# Patient Record
Sex: MALE | Race: WHITE | NOT HISPANIC OR LATINO
[De-identification: names, ages, dates, MRNs, and addresses within clinical notes are randomized per-mention and may not be internally consistent; named-entity substitution may affect disease eponyms.]

---

## 2017-08-30 ENCOUNTER — APPOINTMENT (OUTPATIENT)
Dept: FAMILY MEDICINE | Facility: CLINIC | Age: 47
End: 2017-08-30

## 2017-08-31 ENCOUNTER — TRANSCRIPTION ENCOUNTER (OUTPATIENT)
Age: 47
End: 2017-08-31

## 2017-09-07 ENCOUNTER — TRANSCRIPTION ENCOUNTER (OUTPATIENT)
Age: 47
End: 2017-09-07

## 2018-02-10 ENCOUNTER — TRANSCRIPTION ENCOUNTER (OUTPATIENT)
Age: 48
End: 2018-02-10

## 2018-06-26 ENCOUNTER — TRANSCRIPTION ENCOUNTER (OUTPATIENT)
Age: 48
End: 2018-06-26

## 2018-11-26 ENCOUNTER — TRANSCRIPTION ENCOUNTER (OUTPATIENT)
Age: 48
End: 2018-11-26

## 2019-11-21 ENCOUNTER — APPOINTMENT (OUTPATIENT)
Dept: FAMILY MEDICINE | Facility: CLINIC | Age: 49
End: 2019-11-21
Payer: COMMERCIAL

## 2019-11-21 DIAGNOSIS — R94.5 ABNORMAL RESULTS OF LIVER FUNCTION STUDIES: ICD-10-CM

## 2019-11-21 DIAGNOSIS — Z12.12 ENCOUNTER FOR SCREENING FOR MALIGNANT NEOPLASM OF COLON: ICD-10-CM

## 2019-11-21 DIAGNOSIS — Z12.11 ENCOUNTER FOR SCREENING FOR MALIGNANT NEOPLASM OF COLON: ICD-10-CM

## 2019-11-21 PROCEDURE — 36415 COLL VENOUS BLD VENIPUNCTURE: CPT

## 2019-11-21 PROCEDURE — 99396 PREV VISIT EST AGE 40-64: CPT | Mod: 25

## 2019-11-21 PROCEDURE — G0008: CPT | Mod: 59

## 2019-11-21 PROCEDURE — 90686 IIV4 VACC NO PRSV 0.5 ML IM: CPT

## 2019-11-21 PROCEDURE — G0444 DEPRESSION SCREEN ANNUAL: CPT

## 2019-11-21 NOTE — COUNSELING
[Encouraged to maintain food diary] : Encouraged to maintain food diary [Encouraged to increase physical activity] : Encouraged to increase physical activity [Weigh Self Weekly] : weigh self weekly [Decrease Portions] : decrease portions [Keep Food Diary] : keep food diary [None] : None [Good understanding] : Patient has a good understanding of lifestyle changes and steps needed to achieve self management goal

## 2019-11-21 NOTE — HISTORY OF PRESENT ILLNESS
[FreeTextEntry1] : 49-year-old male here for a CPE. Patient is being seen after 3 years. [de-identified] : Past medical history is significant for chronic GERD and migraine headaches.\par no significant past surgical history.\par The patient is , Has 3 children, works in his own construction business, nonsmoker, no alcohol no drugs.\par He is on no current medication.

## 2019-11-21 NOTE — ASSESSMENT
[FreeTextEntry1] : Physical exam normal\par Blood and urine collected for complete physical profile.\par STD testing declined.\par Screening for colorectal cancer----colonoscopy done 18 months ago, normal. Fecal occult blood ordered.\par Screening for prostate cancer----PSA ordered.\par Flu shot given left deltoid-\par \par Patient is to maintain regular exercise, healthy eating and keep well hydrated.\par Follow up results 3 days.

## 2019-11-21 NOTE — HEALTH RISK ASSESSMENT
[Very Good] : ~his/her~  mood as very good [No] : In the past 12 months have you used drugs other than those required for medical reasons? No [No falls in past year] : Patient reported no falls in the past year [0] : 2) Feeling down, depressed, or hopeless: Not at all (0) [Patient reported colonoscopy was normal] : Patient reported colonoscopy was normal [HIV test declined] : HIV test declined [Hepatitis C test declined] : Hepatitis C test declined [None] : None [With Family] : lives with family [# of Members in Household ___] :  household currently consist of [unfilled] member(s) [Employed] : employed [College] : College [] :  [# Of Children ___] : has [unfilled] children [Sexually Active] : sexually active [Feels Safe at Home] : Feels safe at home [Fully functional (bathing, dressing, toileting, transferring, walking, feeding)] : Fully functional (bathing, dressing, toileting, transferring, walking, feeding) [Fully functional (using the telephone, shopping, preparing meals, housekeeping, doing laundry, using] : Fully functional and needs no help or supervision to perform IADLs (using the telephone, shopping, preparing meals, housekeeping, doing laundry, using transportation, managing medications and managing finances) [Reports normal functional visual acuity (ie: able to read med bottle)] : Reports normal functional visual acuity [Smoke Detector] : smoke detector [Carbon Monoxide Detector] : carbon monoxide detector [Guns at Home] : guns at home [Safety elements used in home] : safety elements used in home [Seat Belt] :  uses seat belt [Sunscreen] : uses sunscreen [Travel to Developing Areas] : travel to developing areas [Patient/Caregiver not ready to engage] : Patient/Caregiver not ready to engage [] : No [Audit-CScore] : 0 [[id number]] : 0 [Change in mental status noted] : No change in mental status noted [Language] : denies difficulty with language [Behavior] : denies difficulty with behavior [Learning/Retaining New Information] : denies difficulty learning/retaining new information [Handling Complex Tasks] : denies difficulty handling complex tasks [Reasoning] : denies difficulty with reasoning [Spatial Ability and Orientation] : denies difficulty with spatial ability and orientation [High Risk Behavior] : no high risk behavior [Reports changes in hearing] : Reports no changes in hearing [Reports changes in vision] : Reports no changes in vision [Reports changes in dental health] : Reports no changes in dental health [TB Exposure] : is not being exposed to tuberculosis [Caregiver Concerns] : does not have caregiver concerns [ColonoscopyDate] : 2018 [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

## 2019-11-22 ENCOUNTER — RESULT REVIEW (OUTPATIENT)
Age: 49
End: 2019-11-22

## 2019-11-22 LAB
25(OH)D3 SERPL-MCNC: 43.6 NG/ML
ALBUMIN SERPL ELPH-MCNC: 4.8 G/DL
ALP BLD-CCNC: 58 U/L
ALT SERPL-CCNC: 31 U/L
ANION GAP SERPL CALC-SCNC: 13 MMOL/L
APPEARANCE: CLEAR
AST SERPL-CCNC: 21 U/L
BASOPHILS # BLD AUTO: 0.07 K/UL
BASOPHILS NFR BLD AUTO: 1.2 %
BILIRUB SERPL-MCNC: 0.5 MG/DL
BILIRUBIN URINE: NEGATIVE
BLOOD URINE: NEGATIVE
BUN SERPL-MCNC: 17 MG/DL
CALCIUM SERPL-MCNC: 9.8 MG/DL
CHLORIDE SERPL-SCNC: 103 MMOL/L
CHOLEST SERPL-MCNC: 236 MG/DL
CHOLEST/HDLC SERPL: 5.4 RATIO
CO2 SERPL-SCNC: 25 MMOL/L
COLOR: YELLOW
CREAT SERPL-MCNC: 1.04 MG/DL
EOSINOPHIL # BLD AUTO: 0.11 K/UL
EOSINOPHIL NFR BLD AUTO: 1.8 %
ESTIMATED AVERAGE GLUCOSE: 108 MG/DL
GLUCOSE QUALITATIVE U: NEGATIVE
GLUCOSE SERPL-MCNC: 96 MG/DL
HBA1C MFR BLD HPLC: 5.4 %
HCT VFR BLD CALC: 49.1 %
HDLC SERPL-MCNC: 44 MG/DL
HGB BLD-MCNC: 16.2 G/DL
IMM GRANULOCYTES NFR BLD AUTO: 0.2 %
KETONES URINE: NEGATIVE
LDLC SERPL CALC-MCNC: 169 MG/DL
LEUKOCYTE ESTERASE URINE: NEGATIVE
LYMPHOCYTES # BLD AUTO: 2.19 K/UL
LYMPHOCYTES NFR BLD AUTO: 36.6 %
MAN DIFF?: NORMAL
MCHC RBC-ENTMCNC: 31.2 PG
MCHC RBC-ENTMCNC: 33 GM/DL
MCV RBC AUTO: 94.4 FL
MONOCYTES # BLD AUTO: 0.59 K/UL
MONOCYTES NFR BLD AUTO: 9.9 %
NEUTROPHILS # BLD AUTO: 3.01 K/UL
NEUTROPHILS NFR BLD AUTO: 50.3 %
NITRITE URINE: NEGATIVE
PH URINE: 6
PLATELET # BLD AUTO: 275 K/UL
POTASSIUM SERPL-SCNC: 4.8 MMOL/L
PROT SERPL-MCNC: 7.6 G/DL
PROTEIN URINE: NEGATIVE
PSA SERPL-MCNC: 0.43 NG/ML
RBC # BLD: 5.2 M/UL
RBC # FLD: 13 %
SODIUM SERPL-SCNC: 141 MMOL/L
SPECIFIC GRAVITY URINE: 1.02
TRIGL SERPL-MCNC: 116 MG/DL
TSH SERPL-ACNC: 1.85 UIU/ML
UROBILINOGEN URINE: NORMAL
WBC # FLD AUTO: 5.98 K/UL

## 2020-10-12 ENCOUNTER — APPOINTMENT (OUTPATIENT)
Dept: FAMILY MEDICINE | Facility: CLINIC | Age: 50
End: 2020-10-12
Payer: COMMERCIAL

## 2020-10-12 VITALS
WEIGHT: 231 LBS | RESPIRATION RATE: 14 BRPM | TEMPERATURE: 96.2 F | HEART RATE: 103 BPM | SYSTOLIC BLOOD PRESSURE: 118 MMHG | OXYGEN SATURATION: 98 % | BODY MASS INDEX: 29.65 KG/M2 | DIASTOLIC BLOOD PRESSURE: 80 MMHG | HEIGHT: 74 IN

## 2020-10-12 DIAGNOSIS — R21 RASH AND OTHER NONSPECIFIC SKIN ERUPTION: ICD-10-CM

## 2020-10-12 DIAGNOSIS — J06.9 ACUTE UPPER RESPIRATORY INFECTION, UNSPECIFIED: ICD-10-CM
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
## 2020-10-12 DIAGNOSIS — Z87.09 PERSONAL HISTORY OF OTHER DISEASES OF THE RESPIRATORY SYSTEM: ICD-10-CM

## 2020-10-12 DIAGNOSIS — Z00.00 ENCOUNTER FOR GENERAL ADULT MEDICAL EXAMINATION W/OUT ABNORMAL FINDINGS: ICD-10-CM

## 2020-10-12 DIAGNOSIS — Z23 ENCOUNTER FOR IMMUNIZATION: ICD-10-CM

## 2020-10-12 DIAGNOSIS — Z00.01 ENCOUNTER FOR GENERAL ADULT MEDICAL EXAMINATION WITH ABNORMAL FINDINGS: ICD-10-CM

## 2020-10-12 DIAGNOSIS — G43.909 MIGRAINE, UNSPECIFIED, NOT INTRACTABLE, W/OUT STATUS MIGRAINOSUS: ICD-10-CM

## 2020-10-12 PROCEDURE — 90686 IIV4 VACC NO PRSV 0.5 ML IM: CPT

## 2020-10-12 PROCEDURE — G0008: CPT

## 2021-06-23 ENCOUNTER — TRANSCRIPTION ENCOUNTER (OUTPATIENT)
Age: 51
End: 2021-06-23

## 2022-01-11 ENCOUNTER — NON-APPOINTMENT (OUTPATIENT)
Age: 52
End: 2022-01-11

## 2022-10-29 ENCOUNTER — NON-APPOINTMENT (OUTPATIENT)
Age: 52
End: 2022-10-29

## 2022-11-26 ENCOUNTER — NON-APPOINTMENT (OUTPATIENT)
Age: 52
End: 2022-11-26

## 2022-12-01 ENCOUNTER — LABORATORY RESULT (OUTPATIENT)
Age: 52
End: 2022-12-01

## 2022-12-01 ENCOUNTER — APPOINTMENT (OUTPATIENT)
Dept: FAMILY MEDICINE | Facility: CLINIC | Age: 52
End: 2022-12-01

## 2022-12-01 VITALS
SYSTOLIC BLOOD PRESSURE: 134 MMHG | WEIGHT: 245 LBS | BODY MASS INDEX: 31.44 KG/M2 | DIASTOLIC BLOOD PRESSURE: 80 MMHG | HEIGHT: 74 IN | RESPIRATION RATE: 12 BRPM | HEART RATE: 88 BPM | OXYGEN SATURATION: 98 % | TEMPERATURE: 97.9 F

## 2022-12-01 DIAGNOSIS — E55.9 VITAMIN D DEFICIENCY, UNSPECIFIED: ICD-10-CM

## 2022-12-01 DIAGNOSIS — Z11.52 ENCOUNTER FOR SCREENING FOR COVID-19: ICD-10-CM

## 2022-12-01 DIAGNOSIS — K21.9 GASTRO-ESOPHAGEAL REFLUX DISEASE W/OUT ESOPHAGITIS: ICD-10-CM

## 2022-12-01 DIAGNOSIS — R53.83 OTHER FATIGUE: ICD-10-CM

## 2022-12-01 DIAGNOSIS — Z12.5 ENCOUNTER FOR SCREENING FOR MALIGNANT NEOPLASM OF PROSTATE: ICD-10-CM

## 2022-12-01 PROCEDURE — 99214 OFFICE O/P EST MOD 30 MIN: CPT | Mod: 25

## 2022-12-01 PROCEDURE — 36415 COLL VENOUS BLD VENIPUNCTURE: CPT

## 2022-12-01 PROCEDURE — 69210 REMOVE IMPACTED EAR WAX UNI: CPT

## 2022-12-02 LAB
25(OH)D3 SERPL-MCNC: 42 NG/ML
ALBUMIN SERPL ELPH-MCNC: 4.7 G/DL
ALP BLD-CCNC: 68 U/L
ALT SERPL-CCNC: 28 U/L
ANION GAP SERPL CALC-SCNC: 17 MMOL/L
APPEARANCE: ABNORMAL
AST SERPL-CCNC: 18 U/L
BASOPHILS # BLD AUTO: 0.06 K/UL
BASOPHILS NFR BLD AUTO: 0.6 %
BILIRUB SERPL-MCNC: 0.4 MG/DL
BILIRUBIN URINE: NEGATIVE
BLOOD URINE: NEGATIVE
BUN SERPL-MCNC: 12 MG/DL
C PEPTIDE SERPL-MCNC: 9.3 NG/ML
CALCIUM SERPL-MCNC: 9.5 MG/DL
CHLORIDE SERPL-SCNC: 100 MMOL/L
CHOLEST SERPL-MCNC: 161 MG/DL
CO2 SERPL-SCNC: 23 MMOL/L
COLOR: YELLOW
COVID-19 NUCLEOCAPSID  GAM ANTIBODY INTERPRETATION: POSITIVE
COVID-19 SPIKE DOMAIN ANTIBODY INTERPRETATION: POSITIVE
CREAT SERPL-MCNC: 1.01 MG/DL
CREAT SPEC-SCNC: 260 MG/DL
EGFR: 89 ML/MIN/1.73M2
EOSINOPHIL # BLD AUTO: 0.39 K/UL
EOSINOPHIL NFR BLD AUTO: 3.6 %
ESTIMATED AVERAGE GLUCOSE: 117 MG/DL
FERRITIN SERPL-MCNC: 810 NG/ML
FOLATE SERPL-MCNC: 16.8 NG/ML
GLUCOSE QUALITATIVE U: NEGATIVE
GLUCOSE SERPL-MCNC: 85 MG/DL
HBA1C MFR BLD HPLC: 5.7 %
HCT VFR BLD CALC: 47 %
HDLC SERPL-MCNC: 36 MG/DL
HGB BLD-MCNC: 15.4 G/DL
IMM GRANULOCYTES NFR BLD AUTO: 0.3 %
IRON SATN MFR SERPL: 11 %
IRON SERPL-MCNC: 32 UG/DL
KETONES URINE: NEGATIVE
LDLC SERPL CALC-MCNC: 91 MG/DL
LEUKOCYTE ESTERASE URINE: NEGATIVE
LYMPHOCYTES # BLD AUTO: 2.51 K/UL
LYMPHOCYTES NFR BLD AUTO: 23.2 %
MAN DIFF?: NORMAL
MCHC RBC-ENTMCNC: 31.2 PG
MCHC RBC-ENTMCNC: 32.8 GM/DL
MCV RBC AUTO: 95.3 FL
MICROALBUMIN 24H UR DL<=1MG/L-MCNC: 2.1 MG/DL
MICROALBUMIN/CREAT 24H UR-RTO: 8 MG/G
MONOCYTES # BLD AUTO: 1.13 K/UL
MONOCYTES NFR BLD AUTO: 10.5 %
NEUTROPHILS # BLD AUTO: 6.68 K/UL
NEUTROPHILS NFR BLD AUTO: 61.8 %
NITRITE URINE: NEGATIVE
NONHDLC SERPL-MCNC: 126 MG/DL
PH URINE: 6
PLATELET # BLD AUTO: 327 K/UL
POTASSIUM SERPL-SCNC: 4.5 MMOL/L
PROT SERPL-MCNC: 7.4 G/DL
PROTEIN URINE: NORMAL
RBC # BLD: 4.93 M/UL
RBC # FLD: 12.7 %
SARS-COV-2 AB SERPL IA-ACNC: >250 U/ML
SARS-COV-2 AB SERPL QL IA: 144 INDEX
SODIUM SERPL-SCNC: 141 MMOL/L
SPECIFIC GRAVITY URINE: 1.02
T4 SERPL-MCNC: 6.3 UG/DL
TIBC SERPL-MCNC: 292 UG/DL
TRIGL SERPL-MCNC: 172 MG/DL
TSH SERPL-ACNC: 1.9 UIU/ML
UIBC SERPL-MCNC: 260 UG/DL
UROBILINOGEN URINE: NORMAL
VIT B12 SERPL-MCNC: 637 PG/ML
WBC # FLD AUTO: 10.8 K/UL

## 2022-12-05 ENCOUNTER — APPOINTMENT (OUTPATIENT)
Dept: FAMILY MEDICINE | Facility: CLINIC | Age: 52
End: 2022-12-05

## 2022-12-05 VITALS
SYSTOLIC BLOOD PRESSURE: 130 MMHG | DIASTOLIC BLOOD PRESSURE: 80 MMHG | BODY MASS INDEX: 30.54 KG/M2 | RESPIRATION RATE: 14 BRPM | HEART RATE: 94 BPM | OXYGEN SATURATION: 98 % | WEIGHT: 238 LBS | TEMPERATURE: 98 F | HEIGHT: 74 IN

## 2022-12-05 DIAGNOSIS — J06.9 ACUTE UPPER RESPIRATORY INFECTION, UNSPECIFIED: ICD-10-CM

## 2022-12-05 DIAGNOSIS — Z78.9 OTHER SPECIFIED HEALTH STATUS: ICD-10-CM

## 2022-12-05 LAB
PSA SERPL-MCNC: 0.45 NG/ML
SARS-COV-2 RDRP RESP QL NAA+PROBE: NEGATIVE
URINE CULTURE <10: NORMAL

## 2022-12-05 PROCEDURE — 99214 OFFICE O/P EST MOD 30 MIN: CPT | Mod: 25

## 2022-12-05 PROCEDURE — 87635 SARS-COV-2 COVID-19 AMP PRB: CPT

## 2022-12-05 PROCEDURE — 87804 INFLUENZA ASSAY W/OPTIC: CPT | Mod: QW

## 2022-12-05 RX ORDER — BROMPHENIRAMINE MALEATE, PSEUDOEPHEDRINE HYDROCHLORIDE, 2; 30; 10 MG/5ML; MG/5ML; MG/5ML
30-2-10 SYRUP ORAL
Qty: 200 | Refills: 0 | Status: COMPLETED | COMMUNITY
Start: 2022-11-27 | End: 2022-12-05

## 2022-12-06 LAB
TESTOST FREE SERPL-MCNC: 7.4 PG/ML
TESTOST SERPL-MCNC: 180 NG/DL

## 2023-11-03 RX ORDER — NEBIVOLOL HYDROCHLORIDE 5 MG/1
5 TABLET ORAL
Qty: 90 | Refills: 0 | Status: DISCONTINUED | COMMUNITY
Start: 2022-07-26 | End: 2023-11-03

## 2023-11-08 ENCOUNTER — APPOINTMENT (OUTPATIENT)
Dept: CARDIOLOGY | Facility: CLINIC | Age: 53
End: 2023-11-08
Payer: COMMERCIAL

## 2023-11-08 VITALS
OXYGEN SATURATION: 98 % | WEIGHT: 253 LBS | HEART RATE: 80 BPM | SYSTOLIC BLOOD PRESSURE: 118 MMHG | DIASTOLIC BLOOD PRESSURE: 80 MMHG | BODY MASS INDEX: 32.48 KG/M2

## 2023-11-08 PROCEDURE — 93000 ELECTROCARDIOGRAM COMPLETE: CPT

## 2023-11-08 PROCEDURE — 99204 OFFICE O/P NEW MOD 45 MIN: CPT | Mod: 25

## 2023-11-08 RX ORDER — PROMETHAZINE HYDROCHLORIDE AND DEXTROMETHORPHAN HYDROBROMIDE ORAL SOLUTION 15; 6.25 MG/5ML; MG/5ML
6.25-15 SOLUTION ORAL
Qty: 240 | Refills: 1 | Status: DISCONTINUED | COMMUNITY
Start: 2022-12-05 | End: 2023-11-08

## 2023-11-08 RX ORDER — IBUPROFEN 600 MG/1
600 TABLET, FILM COATED ORAL
Qty: 15 | Refills: 0 | Status: DISCONTINUED | COMMUNITY
Start: 2022-10-30 | End: 2023-11-08

## 2023-11-08 RX ORDER — AMOXICILLIN AND CLAVULANATE POTASSIUM 875; 125 MG/1; MG/1
875-125 TABLET, COATED ORAL
Qty: 14 | Refills: 0 | Status: DISCONTINUED | COMMUNITY
Start: 2022-12-01 | End: 2023-11-08

## 2023-11-08 RX ORDER — ROSUVASTATIN CALCIUM 5 MG/1
5 TABLET, FILM COATED ORAL DAILY
Qty: 90 | Refills: 2 | Status: DISCONTINUED | COMMUNITY
Start: 2022-08-26 | End: 2023-11-08

## 2023-12-09 ENCOUNTER — NON-APPOINTMENT (OUTPATIENT)
Age: 53
End: 2023-12-09

## 2024-01-02 ENCOUNTER — NON-APPOINTMENT (OUTPATIENT)
Age: 54
End: 2024-01-02

## 2024-01-02 ENCOUNTER — APPOINTMENT (OUTPATIENT)
Dept: ORTHOPEDIC SURGERY | Facility: CLINIC | Age: 54
End: 2024-01-02
Payer: COMMERCIAL

## 2024-01-02 VITALS — HEIGHT: 74 IN | WEIGHT: 250 LBS | BODY MASS INDEX: 32.08 KG/M2

## 2024-01-02 DIAGNOSIS — M47.812 SPONDYLOSIS W/OUT MYELOPATHY OR RADICULOPATHY, CERVICAL REGION: ICD-10-CM

## 2024-01-02 DIAGNOSIS — M19.019 PRIMARY OSTEOARTHRITIS, UNSPECIFIED SHOULDER: ICD-10-CM

## 2024-01-02 PROCEDURE — 99204 OFFICE O/P NEW MOD 45 MIN: CPT

## 2024-01-02 PROCEDURE — 72040 X-RAY EXAM NECK SPINE 2-3 VW: CPT

## 2024-01-02 PROCEDURE — 73030 X-RAY EXAM OF SHOULDER: CPT | Mod: LT

## 2024-01-02 RX ORDER — DICLOFENAC SODIUM 75 MG/1
75 TABLET, DELAYED RELEASE ORAL
Qty: 60 | Refills: 0 | Status: ACTIVE | COMMUNITY
Start: 2024-01-02 | End: 1900-01-01

## 2024-01-02 NOTE — DISCUSSION/SUMMARY
[de-identified] : I went over the pathophysiology of the patient's symptoms in great detail with the patient. I discussed the underlying pathophysiology of the patient's condition in great detail with the patient. I went over the patient's x-rays with them in great detail. We recommended the use of Diclofenac at this time. A prescription was provided. At this time, he will start a course of physical therapy for strengthening and flexibility. A prescription was provided. We discussed the use of ice, Tylenol and anti-inflammatories to relieve pain.   All of their questions were answered. They understand and consent to the plan.   FU in 4-6 weeks. After undergoing PT.

## 2024-01-02 NOTE — PHYSICAL EXAM
[de-identified] : Constitutional o Appearance : well-nourished, well developed, alert, in no acute distress  Head and Face o Head :  Inspection : atraumatic, normocephalic o Face :  Inspection : no visible rash or discoloration Respiratory o Respiratory Effort: breathing unlabored  Neurologic o Sensation : Normal sensation  Psychiatric o Mood and Affect: mood normal, affect appropriate  Lymphatic o Additional Nodes : No palpable lymph nodes present   Cervical Spine o Inspection/Palpation :  Inspection : alignment midline, normal degree of lordosis present  Skin : normal appearance, no masses or tenderness, trachea midline  Palpation : mild paracervical and no right paracervical tenderness  o Range of Motion : extension, limited rotation to the right,  o Tests: Negative Spurlings test  Right Upper Extremity o Right Shoulder :  Inspection/Palpation : no tenderness, no swelling or deformities  Range of Motion : full forward flexion, full IR and ER, no crepitance  Strength : forward elevation 5/5, IR 5/5, ER 5/5, ER at 90 of abduction 5/5, supraspinatus 5/5, adduction 5/5, abduction 5/5, biceps/triceps 5/5,  5/5  Stability : no joint instability on provocative testing   Tests: Bernardo negative, Neer negative, Rfanc negative, drop arm test negative, Satanta's test negative  Left Upper Extremity o Left Shoulder :  Inspection/Palpation : no anterior capsular  tenderness, no swelling or deformities  Range of Motion : full forward flexion and full IR an ER, no crepitance  Strength : forward elevation 5/5, IR 5/5, ER 5/5, ER at 90 of abduction 5/5, supraspinatus 5/5, adduction 5/5, abduction 5/5, biceps/triceps 5/5,  5/5  Stability : no joint instability on provocative testing   Tests: Bernardo negative, Neer negative, Franc negative, drop arm test negative, Satanta's test negative  Gait and Station: Gait: gait normal, no significant extremity swelling or lymphedema, good proprioception and balance  Radiology Results  o Cervical Spine: AP and lateral views were obtained and revealed straightening of the normal cervical lordosis degenerative disc disease at C4-5 C5-6 and C6-7  o Left Shoulder: AP internal/external rotation and outlet views were obtained and revealed degenerative arthritis and a down sloping acromion

## 2024-01-02 NOTE — ADDENDUM
[FreeTextEntry1] : I, AR YOUSSEF, acted solely as a scribe for Dr. Kamari Ribeiro on this date 01/02/2024.  All medical record entries made by the Scribe were at my, Dr. Kamari Ribeiro, direction and personally dictated by me on 01/02/2024. I have reviewed the chart and agree that the record accurately reflects my personal performance of the history, physical exam, assessment and plan. I have also personally directed, reviewed, and agreed with the chart.

## 2024-01-02 NOTE — HISTORY OF PRESENT ILLNESS
[de-identified] : 53-year-old male presents complaining of left shoulder pain. He states both shoulders are about the same, but he over did his left shoulder recently.  He states he threw a heavy suitcase onto a carousel. He states he was noticing decreased motion of his left shoulder. He was not able to lift his shoulder above his head. He states she had tingling down his left arm. He states when he is on a walk, he gets tingling all the down his left arm into his left fingers. 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

## 2024-02-13 ENCOUNTER — APPOINTMENT (OUTPATIENT)
Dept: ORTHOPEDIC SURGERY | Facility: CLINIC | Age: 54
End: 2024-02-13

## 2024-03-13 ENCOUNTER — APPOINTMENT (OUTPATIENT)
Dept: CARDIOLOGY | Facility: CLINIC | Age: 54
End: 2024-03-13
Payer: COMMERCIAL

## 2024-03-13 ENCOUNTER — NON-APPOINTMENT (OUTPATIENT)
Age: 54
End: 2024-03-13

## 2024-03-13 VITALS
WEIGHT: 250 LBS | HEART RATE: 84 BPM | BODY MASS INDEX: 32.08 KG/M2 | HEIGHT: 74 IN | SYSTOLIC BLOOD PRESSURE: 104 MMHG | DIASTOLIC BLOOD PRESSURE: 76 MMHG | OXYGEN SATURATION: 98 %

## 2024-03-13 LAB
ALBUMIN SERPL ELPH-MCNC: 5 G/DL
ALP BLD-CCNC: 56 U/L
ALT SERPL-CCNC: 35 U/L
ANION GAP SERPL CALC-SCNC: 11 MMOL/L
AST SERPL-CCNC: 29 U/L
BILIRUB SERPL-MCNC: 0.5 MG/DL
BUN SERPL-MCNC: 16 MG/DL
CALCIUM SERPL-MCNC: 9.8 MG/DL
CHLORIDE SERPL-SCNC: 102 MMOL/L
CHOLEST SERPL-MCNC: 182 MG/DL
CO2 SERPL-SCNC: 25 MMOL/L
CREAT SERPL-MCNC: 1.06 MG/DL
EGFR: 83 ML/MIN/1.73M2
GLUCOSE SERPL-MCNC: 95 MG/DL
HCT VFR BLD CALC: 47.9 %
HDLC SERPL-MCNC: 41 MG/DL
HGB BLD-MCNC: 16 G/DL
LDLC SERPL CALC-MCNC: 105 MG/DL
MCHC RBC-ENTMCNC: 31.9 PG
MCHC RBC-ENTMCNC: 33.4 GM/DL
MCV RBC AUTO: 95.4 FL
NONHDLC SERPL-MCNC: 141 MG/DL
PLATELET # BLD AUTO: 294 K/UL
POTASSIUM SERPL-SCNC: 4.5 MMOL/L
PROT SERPL-MCNC: 7.5 G/DL
RBC # BLD: 5.02 M/UL
RBC # FLD: 13.4 %
SODIUM SERPL-SCNC: 139 MMOL/L
T4 SERPL-MCNC: 5.6 UG/DL
TRIGL SERPL-MCNC: 208 MG/DL
TSH SERPL-ACNC: 1.62 UIU/ML
WBC # FLD AUTO: 9.6 K/UL

## 2024-03-13 PROCEDURE — 99214 OFFICE O/P EST MOD 30 MIN: CPT

## 2024-03-13 PROCEDURE — 93306 TTE W/DOPPLER COMPLETE: CPT

## 2024-03-13 PROCEDURE — 93015 CV STRESS TEST SUPVJ I&R: CPT

## 2024-03-13 RX ORDER — ROSUVASTATIN CALCIUM 10 MG/1
10 TABLET, FILM COATED ORAL
Qty: 90 | Refills: 3 | Status: ACTIVE | COMMUNITY
Start: 1900-01-01 | End: 1900-01-01

## 2024-03-13 NOTE — DISCUSSION/SUMMARY
[FreeTextEntry1] : Patient is a 53-year-old male well-known to my practice who is followed for hypertension, hyperlipidemia and has known obstructive sleep apnea. Patient had his Bystolic titrated upwards and statin titrated upwards at the time of his last visit for blood pressures ranging as high as 140/92.  He presents today for updated stress test, echocardiogram and follow-up.  His stress test was notable for hypertensive response but he achieved target heart rate had no symptoms or EKG changes.  Echocardiography was consistent with LVH his blood pressure was less than ideal and exaggerated with stress testing.  Losartan 80 mg was added to his regimen, he is otherwise recommended to maintain his Bystolic was advised that it did not blunt his heart rate response to exercise and otherwise he is taking and tolerating statin therapy well without issue.  No changes were made to his medical regimen in that regard today.  Patient was reassured regarding his cardiac stability.  Encouraged exercise lose weight and otherwise return for blood pressure check in 2 months and interval follow-up in 4 months.  I was pleased with his response to sleep apnea mask and he notes he is more awake and alert during the day and is tolerating it reasonably well.  Joel Goldberg, MD, FACC

## 2024-03-13 NOTE — REVIEW OF SYSTEMS
[Weight Gain (___ Lbs)] : [unfilled] ~Ulb weight gain [SOB] : no shortness of breath [Dyspnea on exertion] : not dyspnea during exertion [Chest Discomfort] : no chest discomfort [Lower Ext Edema] : no extremity edema [Leg Claudication] : no intermittent leg claudication [Palpitations] : no palpitations [Orthopnea] : no orthopnea [PND] : no PND [Syncope] : no syncope [Change in Appetite] : no change in appetite [Negative] : Gastrointestinal

## 2024-03-13 NOTE — PHYSICAL EXAM
[Well Developed] : well developed [Well Nourished] : well nourished [No Acute Distress] : no acute distress [Normal] : normal venous pressure, no carotid bruit [Normal Venous Pressure] : normal venous pressure [No Carotid Bruit] : no carotid bruit [Normal S1, S2] : normal S1, S2 [No Murmur] : no murmur [No Rub] : no rub [Clear Lung Fields] : clear lung fields [Good Air Entry] : good air entry [No Respiratory Distress] : no respiratory distress  [Soft] : abdomen soft [Non Tender] : non-tender [No Masses/organomegaly] : no masses/organomegaly [Normal Gait] : normal gait [No Edema] : no edema [No Cyanosis] : no cyanosis [No Focal Deficits] : no focal deficits

## 2024-03-13 NOTE — CARDIOLOGY SUMMARY
[de-identified] : (3/13/2024) ECHOCARDIOGRAPHIC CONCLUSIONS:  1. Left ventricular cavity is small. Left ventricular wall thickness is normal. Left ventricular systolic function is normal with an ejection fraction of 62 % by Schmidt's method of disks. There are no regional wall motion abnormalities seen. 2. Normal left ventricular diastolic function, with normal filling pressure. 3. Normal right ventricular cavity size, with wall thickness, and normal systolic function. 4. Mild mitral regurgitation. 5. No echocardiographic evidence of pulmonary hypertension. 6. No pericardial effusion seen. 7. Moderate left ventricular hypertrophy. 8. No prior echocardiogram is available for comparison. 9. Technically difficult image quality.

## 2024-03-13 NOTE — REASON FOR VISIT
[FreeTextEntry1] : Patient is a 53-year-old white male well-known to my practice.  He presents to the office today for an echocardiogram and stress test recommended at time of his last visit to evaluate his blood pressure response to exercise and to evaluate his heart structurally for known LVH and longstanding hypertension.   Patient has known hypertension, hyperlipidemia, a strong family history of cardiac disease and presents today fortunately with no new or active cardiovascular symptoms for the above testing and follow-up

## 2024-05-10 RX ORDER — VALSARTAN 80 MG/1
80 TABLET, COATED ORAL
Qty: 90 | Refills: 3 | Status: ACTIVE | COMMUNITY
Start: 2024-03-13 | End: 1900-01-01

## 2024-05-10 RX ORDER — NEBIVOLOL 10 MG/1
10 TABLET ORAL DAILY
Qty: 90 | Refills: 2 | Status: ACTIVE | COMMUNITY
Start: 1900-01-01 | End: 1900-01-01

## 2024-05-22 ENCOUNTER — NON-APPOINTMENT (OUTPATIENT)
Age: 54
End: 2024-05-22

## 2024-05-22 ENCOUNTER — APPOINTMENT (OUTPATIENT)
Dept: CARDIOLOGY | Facility: CLINIC | Age: 54
End: 2024-05-22
Payer: COMMERCIAL

## 2024-05-22 VITALS
SYSTOLIC BLOOD PRESSURE: 108 MMHG | BODY MASS INDEX: 33.77 KG/M2 | DIASTOLIC BLOOD PRESSURE: 74 MMHG | HEART RATE: 87 BPM | WEIGHT: 263 LBS | OXYGEN SATURATION: 95 %

## 2024-05-22 DIAGNOSIS — E66.3 OVERWEIGHT: ICD-10-CM

## 2024-05-22 DIAGNOSIS — E78.5 HYPERLIPIDEMIA, UNSPECIFIED: ICD-10-CM

## 2024-05-22 DIAGNOSIS — R03.0 ELEVATED BLOOD-PRESSURE READING, W/OUT DIAGNOSIS OF HYPERTENSION: ICD-10-CM

## 2024-05-22 DIAGNOSIS — R73.03 PREDIABETES.: ICD-10-CM

## 2024-05-22 DIAGNOSIS — I51.7 CARDIOMEGALY: ICD-10-CM

## 2024-05-22 DIAGNOSIS — G47.33 OBSTRUCTIVE SLEEP APNEA (ADULT) (PEDIATRIC): ICD-10-CM

## 2024-05-22 PROCEDURE — 93000 ELECTROCARDIOGRAM COMPLETE: CPT

## 2024-05-22 PROCEDURE — 99214 OFFICE O/P EST MOD 30 MIN: CPT

## 2024-05-29 NOTE — HISTORY OF PRESENT ILLNESS
[FreeTextEntry1] : Patient is a very pleasant 53-year-old male well-known to me who presents today for both echocardiography and stress testing, recommended at the time of his last visit for previously documented LVH, known hypertension and a strong family history of heart disease.  Patient was originally seen in the office here in November, he has known hypertension, hyperlipidemia and concerns over his strong family history of CAD. His father had a TAVR performed earlier this year. Patient notes that he was seen back in [place] by nurse practitioner Barbara Navarro in June. Prior to that visit in June his BP was less than ideally controlled and his Bystolic was increased from 5 to 10 mg/day. He incorporated that into his regimen and presents today with stress testing on medication. Stress test was negative for ischemia. Borderline hypertensive response no blunting of his heart rate on the medication and he is tolerating it well. His statin therapy also was intensified changing his Crestor from 5 mg alternate day to 10 mg alternate day but he notes she is taking it daily and tolerating it well.  At the time of his last visit that he notes that he feels generally well, his cholesterol has dropped from 208 in February down to 161 currently HDL is only 42 triglycerides dropped from 237-1 67.  Patient notes that he walks in excess of 10,000 steps per day but is not involved in strenuous activity on a regimented basis. While active he denies chest pain, shortness of breath, palpitations or dizziness. He has in fact gained from 240-253 and is only lost 3 pounds. He notes today that he is incorporated his sleep apnea mask and on the days he use it he feels fabulous the next day with lots of energy, no daytime somnolence and feels great that he has had this diagnosis made and the responses had to therapy.  Patient presents today generally feeling well, for updated stress test and echocardiogram (see cardiology summary)

## 2024-05-29 NOTE — CARDIOLOGY SUMMARY
[de-identified] : (5/22/2024) NSR at 86 beats minute with a frontal QRS axis -45 degrees otherwise normal tracing. [de-identified] : (3/21/2024) STRESS TEST CONCLUSIONS 1. Stress electrocardiogram: No significant ischemic ST segment changes beyond baseline abnormalities. 2. No evidence of exercise induced ischemia or arrhythmias by EKG  ---------------------------------------------------------------------------------------------------------------------------------------------------------  Stress Test Results: Protocol: Standard Deshawn METS Achieved: 11 Stage Reached: 4 Exercise Duration: 9 min and 31 sec. Heart Rate: Resting 81 bpm, Stress peak 142 bpm (86 % max predicted) HR Response: Normal. Blood Pressure: Resting 104/76 mmHg, Stress max 142/96 mmHg BP Response: Normal. Exercise Capacity: Good. Pretest Chest Pain: None. Stress Test Chest Pain: No chest pain Symptoms During Stress: Shortness of breath. Reason for Termination: Target heart rate achieved, fatigue, dyspnea and patient request.   ---------------------------------------------------------------------------------------------------------------------------------------------------------Electrocardiogram: Baseline electrocardiogram: Normal sinus rhythm at a rate of 81 bpm. Stress electrocardiogram: No significant ischemic ST segment changes beyond baseline abnormalities.   Heart rate and blood pressure: The heart rate response was normal. The blood pressure response was normal.  Exercise Capacity: The patient achieved 11 METS, which is consistent with good exercise capacity.  Stress Supervising Provider: Electronically signed by [id number] Cherise Salinas Interpreting Provider: Electronically signed on 3/20/2024 at 12:46:50 PM by [id number] Sanam Bravo [de-identified] : (3/13/2024) ECHOCARDIOGRAPHIC CONCLUSIONS:  1. Left ventricular cavity is small. Left ventricular wall thickness is normal. Left ventricular systolic function is normal with an ejection fraction of 62 % by Schmidt's method of disks. There are no regional wall motion abnormalities seen. 2. Normal left ventricular diastolic function, with normal filling pressure. 3. Normal right ventricular cavity size, with wall thickness, and normal systolic function. 4. Mild mitral regurgitation. 5. No echocardiographic evidence of pulmonary hypertension. 6. No pericardial effusion seen. 7. Moderate left ventricular hypertrophy. 8. No prior echocardiogram is available for comparison. 9. Technically difficult image quality.

## 2024-05-29 NOTE — HISTORY OF PRESENT ILLNESS
[FreeTextEntry1] : Patient is a very pleasant 53-year-old male well-known to me who presents today for both echocardiography and stress testing, recommended at the time of his last visit for previously documented LVH, known hypertension and a strong family history of heart disease.  Patient was originally seen in the office here in November, he has known hypertension, hyperlipidemia and concerns over his strong family history of CAD. His father had a TAVR performed earlier this year. Patient notes that he was seen back in [place] by nurse practitioner Barbara Navarro in June. Prior to that visit in June his BP was less than ideally controlled and his Bystolic was increased from 5 to 10 mg/day. He incorporated that into his regimen and presents today with stress testing on medication. Stress test was negative for ischemia. Borderline hypertensive response no blunting of his heart rate on the medication and he is tolerating it well. His statin therapy also was intensified changing his Crestor from 5 mg alternate day to 10 mg alternate day but he notes she is taking it daily and tolerating it well.  At the time of his last visit that he notes that he feels generally well, his cholesterol has dropped from 208 in February down to 161 currently HDL is only 42 triglycerides dropped from 237-1 67.  Patient notes that he walks in excess of 10,000 steps per day but is not involved in strenuous activity on a regimented basis. While active he denies chest pain, shortness of breath, palpitations or dizziness. He has in fact gained from 240-253 and is only lost 3 pounds. He notes today that he is incorporated his sleep apnea mask and on the days he use it he feels fabulous the next day with lots of energy, no daytime somnolence and feels great that he has had this diagnosis made and the responses had to therapy.  Patient presents today generally feeling well, for updated stress test and echocardiogram (see cardiology summary)

## 2024-05-29 NOTE — REVIEW OF SYSTEMS
[Weight Gain (___ Lbs)] : [unfilled] ~Ulb weight gain [Negative] : Gastrointestinal [SOB] : no shortness of breath [Dyspnea on exertion] : not dyspnea during exertion [Chest Discomfort] : no chest discomfort [Lower Ext Edema] : no extremity edema [Leg Claudication] : no intermittent leg claudication [Palpitations] : no palpitations [Orthopnea] : no orthopnea [PND] : no PND [Syncope] : no syncope [Change in Appetite] : no change in appetite

## 2024-05-29 NOTE — CARDIOLOGY SUMMARY
[de-identified] : (5/22/2024) NSR at 86 beats minute with a frontal QRS axis -45 degrees otherwise normal tracing. [de-identified] : (3/21/2024) STRESS TEST CONCLUSIONS 1. Stress electrocardiogram: No significant ischemic ST segment changes beyond baseline abnormalities. 2. No evidence of exercise induced ischemia or arrhythmias by EKG  ---------------------------------------------------------------------------------------------------------------------------------------------------------  Stress Test Results: Protocol: Standard Deshawn METS Achieved: 11 Stage Reached: 4 Exercise Duration: 9 min and 31 sec. Heart Rate: Resting 81 bpm, Stress peak 142 bpm (86 % max predicted) HR Response: Normal. Blood Pressure: Resting 104/76 mmHg, Stress max 142/96 mmHg BP Response: Normal. Exercise Capacity: Good. Pretest Chest Pain: None. Stress Test Chest Pain: No chest pain Symptoms During Stress: Shortness of breath. Reason for Termination: Target heart rate achieved, fatigue, dyspnea and patient request.   ---------------------------------------------------------------------------------------------------------------------------------------------------------Electrocardiogram: Baseline electrocardiogram: Normal sinus rhythm at a rate of 81 bpm. Stress electrocardiogram: No significant ischemic ST segment changes beyond baseline abnormalities.   Heart rate and blood pressure: The heart rate response was normal. The blood pressure response was normal.  Exercise Capacity: The patient achieved 11 METS, which is consistent with good exercise capacity.  Stress Supervising Provider: Electronically signed by [id number] Cherise Salinas Interpreting Provider: Electronically signed on 3/20/2024 at 12:46:50 PM by [id number] Sanam Bravo [de-identified] : (3/13/2024) ECHOCARDIOGRAPHIC CONCLUSIONS:  1. Left ventricular cavity is small. Left ventricular wall thickness is normal. Left ventricular systolic function is normal with an ejection fraction of 62 % by Schmidt's method of disks. There are no regional wall motion abnormalities seen. 2. Normal left ventricular diastolic function, with normal filling pressure. 3. Normal right ventricular cavity size, with wall thickness, and normal systolic function. 4. Mild mitral regurgitation. 5. No echocardiographic evidence of pulmonary hypertension. 6. No pericardial effusion seen. 7. Moderate left ventricular hypertrophy. 8. No prior echocardiogram is available for comparison. 9. Technically difficult image quality.

## 2024-05-29 NOTE — DISCUSSION/SUMMARY
[EKG obtained to assist in diagnosis and management of assessed problem(s)] : EKG obtained to assist in diagnosis and management of assessed problem(s) [FreeTextEntry1] : Patient is a 54-year-old male well-known to my practice who is followed for hypertension, hyperlipidemia and has known obstructive sleep apnea.  Patient had his Bystolic titrated upwards and statin titrated upwards at the time of his last visit for blood pressures ranging as high as 140/92 and hypertensive response to a negative stress test..  At the visit today his blood pressure is acceptable at 108/74.  His lipids have improved with a cholesterol of 161 HDL 42 LDL of 92 mildly elevated triglycerides.  Patient is taking and tolerating all of his medications and also is using his BiPAP mask as per Dr. Herzog.  Patient presents today with no new or active cardiovascular complaints.  I cautioned him about his weight gain and recommended he implement an exercise program.  He does not like exercise and is quite busy at work but he needs to view that recommendation as if it were a prescription.  Patient was reassured regarding the structural stability of his heart echocardiographically but he does have LVH likely accounting for his change in EKG.  Stress testing also was reassuring as there is no evidence of provokable ischemia.  Patient was once again admonished for his lack of current exercise and weight loss but reassured that all cardiovascular risk factors are now ideally controlled to the best of our ability.  His contribution is what is required at this point in time.     No change were made to his medical regimen at this point in time.  He will return in September for routine interval follow-up at which time updated labs will be obtained.  Joel Goldberg, MD, FACC

## 2024-05-29 NOTE — REASON FOR VISIT
[FreeTextEntry1] : Patient is a [age]-year-old white male well-known to my practice.  He presents to the office today for a routine interval follow-up, he was last seen here in March to review an echocardiogram and stress test recommended at time of his last visit to evaluate his blood pressure response to exercise and to evaluate his heart structurally for known LVH and longstanding hypertension.  His echocardiogram showed mild MR with moderate LVH and a preserved ejection fraction.  Stress testing was negative for ischemia, notable for hypertensive response and he returns to the office today for reevaluation of blood pressure after titration of medications   Patient has known hypertension, hyperlipidemia, a strong family history of cardiac disease and presents today fortunately with no new or active cardiovascular symptoms for follow-up after initiation of more aggressive antihypertensive regimen consisting of Bystolic 10 mg/day as well as 80 mg of losartan and instructions to lose weight, restrict salt and increase level of exercise which she is not.

## 2024-07-17 ENCOUNTER — APPOINTMENT (OUTPATIENT)
Dept: CARDIOLOGY | Facility: CLINIC | Age: 54
End: 2024-07-17

## 2024-09-11 ENCOUNTER — APPOINTMENT (OUTPATIENT)
Dept: CARDIOLOGY | Facility: CLINIC | Age: 54
End: 2024-09-11

## 2024-09-11 DIAGNOSIS — G47.33 OBSTRUCTIVE SLEEP APNEA (ADULT) (PEDIATRIC): ICD-10-CM

## 2024-09-11 DIAGNOSIS — E66.3 OVERWEIGHT: ICD-10-CM

## 2024-09-11 DIAGNOSIS — R03.0 ELEVATED BLOOD-PRESSURE READING, W/OUT DIAGNOSIS OF HYPERTENSION: ICD-10-CM

## 2024-09-11 PROCEDURE — 99214 OFFICE O/P EST MOD 30 MIN: CPT

## 2024-09-11 RX ORDER — UBIDECARENONE 10 MG
10 CAPSULE ORAL
Refills: 0 | Status: ACTIVE | COMMUNITY

## 2024-09-20 ENCOUNTER — NON-APPOINTMENT (OUTPATIENT)
Age: 54
End: 2024-09-20

## 2024-09-21 NOTE — CARDIOLOGY SUMMARY
[de-identified] : (3/21/2024) STRESS TEST CONCLUSIONS 1. Stress electrocardiogram: No significant ischemic ST segment changes beyond baseline abnormalities. 2. No evidence of exercise induced ischemia or arrhythmias by EKG  ---------------------------------------------------------------------------------------------------------------------------------------------------------  Stress Test Results: Protocol: Standard Deshawn METS Achieved: 11 Stage Reached: 4 Exercise Duration: 9 min and 31 sec. Heart Rate: Resting 81 bpm, Stress peak 142 bpm (86 % max predicted) HR Response: Normal. Blood Pressure: Resting 104/76 mmHg, Stress max 142/96 mmHg BP Response: Normal. Exercise Capacity: Good. Pretest Chest Pain: None. Stress Test Chest Pain: No chest pain Symptoms During Stress: Shortness of breath. Reason for Termination: Target heart rate achieved, fatigue, dyspnea and patient request.   ---------------------------------------------------------------------------------------------------------------------------------------------------------Electrocardiogram: Baseline electrocardiogram: Normal sinus rhythm at a rate of 81 bpm. Stress electrocardiogram: No significant ischemic ST segment changes beyond baseline abnormalities.   Heart rate and blood pressure: The heart rate response was normal. The blood pressure response was normal.  Exercise Capacity: The patient achieved 11 METS, which is consistent with good exercise capacity.  Stress Supervising Provider: Electronically signed by [id number] Cherise Salinas Interpreting Provider: Electronically signed on 3/20/2024 at 12:46:50 PM by [id number] Sanam Bravo     [de-identified] : (3/21/2024) STRESS TEST CONCLUSIONS 1. Stress electrocardiogram: No significant ischemic ST segment changes beyond baseline abnormalities. 2. No evidence of exercise induced ischemia or arrhythmias by EKG  ---------------------------------------------------------------------------------------------------------------------------------------------------------  Stress Test Results: Protocol: Standard Deshawn METS Achieved: 11 Stage Reached: 4 Exercise Duration: 9 min and 31 sec. Heart Rate: Resting 81 bpm, Stress peak 142 bpm (86 % max predicted) HR Response: Normal. Blood Pressure: Resting 104/76 mmHg, Stress max 142/96 mmHg BP Response: Normal. Exercise Capacity: Good. Pretest Chest Pain: None. Stress Test Chest Pain: No chest pain Symptoms During Stress: Shortness of breath. Reason for Termination: Target heart rate achieved, fatigue, dyspnea and patient request.   ---------------------------------------------------------------------------------------------------------------------------------------------------------Electrocardiogram: Baseline electrocardiogram: Normal sinus rhythm at a rate of 81 bpm. Stress electrocardiogram: No significant ischemic ST segment changes beyond baseline abnormalities.   Heart rate and blood pressure: The heart rate response was normal. The blood pressure response was normal.  Exercise Capacity: The patient achieved 11 METS, which is consistent with good exercise capacity.  Stress Supervising Provider: Electronically signed by [id number] Cherise Salinas Interpreting Provider: Electronically signed on 3/20/2024 at 12:46:50 PM by [id number] Sanam Bravo (3/13/2024) ECHOCARDIOGRAPHIC CONCLUSIONS:  1. Left ventricular cavity is small. Left ventricular wall thickness is normal. Left ventricular systolic function is normal with an ejection fraction of 62 % by Schmidt's method of disks. There are no regional wall motion abnormalities seen. 2. Normal left ventricular diastolic function, with normal filling pressure. 3. Normal right ventricular cavity size, with wall thickness, and normal systolic function. 4. Mild mitral regurgitation. 5. No echocardiographic evidence of pulmonary hypertension. 6. No pericardial effusion seen. 7. Moderate left ventricular hypertrophy. 8. No prior echocardiogram is available for comparison. 9. Technically difficult image quality.

## 2024-09-21 NOTE — REASON FOR VISIT
[CV Risk Factors and Non-Cardiac Disease] : CV risk factors and non-cardiac disease [Hyperlipidemia] : hyperlipidemia [Hypertension] : hypertension [Other: ____] : [unfilled] [FreeTextEntry1] : Patient is a 54-year-old white male well-known to my practice.  He presents to the office today for a routine interval follow-up, he was last seen here in May to follow-up after he had reviewed an echocardiogram and stress test recommended at time of a previous visit to evaluate his blood pressure response to exercise and to evaluate his heart structurally for known LVH and longstanding hypertension.  His echocardiogram showed mild MR with moderate LVH and a preserved ejection fraction.  Stress testing was negative for ischemia, notable for hypertensive response and he returns to the office today for reevaluation of blood pressure after titration of medications   Patient has known hypertension, hyperlipidemia, a strong family history of cardiac disease and presents today only having lost 5 pounds from 263 down to 258 in this interval.  Fortunately he has no new or active cardiovascular symptoms for follow-up after initiation of more aggressive antihypertensive regimen consisting of Bystolic 10 mg/day as well as 80 mg of losartan and instructions to lose weight, restrict salt and increase level of exercise which he has yet to commit to.

## 2024-09-21 NOTE — DISCUSSION/SUMMARY
[EKG obtained to assist in diagnosis and management of assessed problem(s)] : EKG obtained to assist in diagnosis and management of assessed problem(s) [FreeTextEntry1] : Patient is a 54 -year-old male well-known to my practice who is followed for hypertension which is well-controlled, hyperlipidemia that he is on statin therapy for his lipids are now ideal but he continues to have significant weight problems and is not exercising on a regular basis.  Patient has past echocardiograms and stress test recently it has difficulty losing weight or committing to more strenuous exercise.  Perhaps it is his  known obstructive sleep apnea that is causing his daytime fatigue.  In the past he has had  his Bystolic titrated upwards and statin titrated upwards at the time of his last visit for blood pressures ranging as high as 140/92 currently blood pressure is ideal at 116/66.  At the visit today his blood pressure is acceptable.  His lipids have improved with a cholesterol of 161 HDL 42 LDL of 92 mildly elevated triglycerides.  Patient is taking and tolerating all of his medications and also is using his BiPAP mask as per Dr. Herzog.  Patient presents today with no new or active cardiovascular complaints.  I cautioned him about his weight gain and recommended he implement an exercise program and cut carbohydrates dramatically.  In the past he was advised   to view that recommendation as if it were a prescription.   No change were made to his medical regimen at this point in time.  He was again aggressively encouraged to implement an exercise program and cut carbohydrates and otherwise will return back to the office in January for interval follow-up.     Joel Goldberg, MD, FACC

## 2024-09-21 NOTE — HISTORY OF PRESENT ILLNESS
[FreeTextEntry1] : Patient is a very pleasant 54-year-old male well-known to me with a a strong family history of heart disease when he was last here he was advised to exercise, lose weight to continue his antihypertensive regimen as well as statin therapy and return at this time to reevaluate his blood pressure control and weight issues..  Patient was originally seen in the office here in November 2023 he has known hypertension, hyperlipidemia and concerns over his strong family history of CAD. His father had a TAVR performed earlier this year. Patient notes that he was seen back in [place] by nurse practitioner Barbara Navarro in June. Prior to that visit in June his BP was less than ideally controlled and his Bystolic was increased from 5 to 10 mg/day. He incorporated that into his regimen and presented after having had a negative stress test but a borderline hypertensive response.  His heart rate was not  blunted  on the medication and he is tolerating it well. His statin therapy also was intensified changing his Crestor from 5 mg alternate day to 10 mg alternate day as his labs were not at goal.  He does note he is taking it daily and tolerating it well.  At the time of his last visit that he notes that he feels generally well, his cholesterol has dropped from 208 in February down to 161 currently HDL is only 42 triglycerides dropped from 237-1 67.  Patient notes that he walks in excess of 10,000 steps per day as part of his life but is not involved in strenuous activity on a regimented basis. While active he denies chest pain, shortness of breath, palpitations or dizziness. He has in fact gained from 240-253 at last visit he went as high as 263 and is now back down to 258 and is only lost 5 pounds. He notes today that he is incorporated his sleep apnea mask and on the days he use it he feels fabulous the next day with lots of energy, no daytime somnolence and feels great that he has had this diagnosis made and the responses had to therapy.  Patient presents today generally feeling well for interval follow-up noting that he had a good summer has not really changed his exercise routine to a great degree.

## 2024-09-21 NOTE — CARDIOLOGY SUMMARY
[de-identified] : (3/21/2024) STRESS TEST CONCLUSIONS 1. Stress electrocardiogram: No significant ischemic ST segment changes beyond baseline abnormalities. 2. No evidence of exercise induced ischemia or arrhythmias by EKG  ---------------------------------------------------------------------------------------------------------------------------------------------------------  Stress Test Results: Protocol: Standard Deshawn METS Achieved: 11 Stage Reached: 4 Exercise Duration: 9 min and 31 sec. Heart Rate: Resting 81 bpm, Stress peak 142 bpm (86 % max predicted) HR Response: Normal. Blood Pressure: Resting 104/76 mmHg, Stress max 142/96 mmHg BP Response: Normal. Exercise Capacity: Good. Pretest Chest Pain: None. Stress Test Chest Pain: No chest pain Symptoms During Stress: Shortness of breath. Reason for Termination: Target heart rate achieved, fatigue, dyspnea and patient request.   ---------------------------------------------------------------------------------------------------------------------------------------------------------Electrocardiogram: Baseline electrocardiogram: Normal sinus rhythm at a rate of 81 bpm. Stress electrocardiogram: No significant ischemic ST segment changes beyond baseline abnormalities.   Heart rate and blood pressure: The heart rate response was normal. The blood pressure response was normal.  Exercise Capacity: The patient achieved 11 METS, which is consistent with good exercise capacity.  Stress Supervising Provider: Electronically signed by [id number] Cherise Salinas Interpreting Provider: Electronically signed on 3/20/2024 at 12:46:50 PM by [id number] Sanam Bravo     [de-identified] : (3/21/2024) STRESS TEST CONCLUSIONS 1. Stress electrocardiogram: No significant ischemic ST segment changes beyond baseline abnormalities. 2. No evidence of exercise induced ischemia or arrhythmias by EKG  ---------------------------------------------------------------------------------------------------------------------------------------------------------  Stress Test Results: Protocol: Standard Deshawn METS Achieved: 11 Stage Reached: 4 Exercise Duration: 9 min and 31 sec. Heart Rate: Resting 81 bpm, Stress peak 142 bpm (86 % max predicted) HR Response: Normal. Blood Pressure: Resting 104/76 mmHg, Stress max 142/96 mmHg BP Response: Normal. Exercise Capacity: Good. Pretest Chest Pain: None. Stress Test Chest Pain: No chest pain Symptoms During Stress: Shortness of breath. Reason for Termination: Target heart rate achieved, fatigue, dyspnea and patient request.   ---------------------------------------------------------------------------------------------------------------------------------------------------------Electrocardiogram: Baseline electrocardiogram: Normal sinus rhythm at a rate of 81 bpm. Stress electrocardiogram: No significant ischemic ST segment changes beyond baseline abnormalities.   Heart rate and blood pressure: The heart rate response was normal. The blood pressure response was normal.  Exercise Capacity: The patient achieved 11 METS, which is consistent with good exercise capacity.  Stress Supervising Provider: Electronically signed by [id number] Cherise Salinas Interpreting Provider: Electronically signed on 3/20/2024 at 12:46:50 PM by [id number] Sanam Bravo (3/13/2024) ECHOCARDIOGRAPHIC CONCLUSIONS:  1. Left ventricular cavity is small. Left ventricular wall thickness is normal. Left ventricular systolic function is normal with an ejection fraction of 62 % by Schmidt's method of disks. There are no regional wall motion abnormalities seen. 2. Normal left ventricular diastolic function, with normal filling pressure. 3. Normal right ventricular cavity size, with wall thickness, and normal systolic function. 4. Mild mitral regurgitation. 5. No echocardiographic evidence of pulmonary hypertension. 6. No pericardial effusion seen. 7. Moderate left ventricular hypertrophy. 8. No prior echocardiogram is available for comparison. 9. Technically difficult image quality.

## 2024-09-21 NOTE — HISTORY OF PRESENT ILLNESS
[FreeTextEntry1] : Patient is a very pleasant 54-year-old male well-known to me with a a strong family history of heart disease when he was last here he was advised to exercise, lose weight to continue his antihypertensive regimen as well as statin therapy and return at this time to reevaluate his blood pressure control and weight issues..  Patient was originally seen in the office here in November 2023 he has known hypertension, hyperlipidemia and concerns over his strong family history of CAD. His father had a TAVR performed earlier this year. Patient notes that he was seen back in [place] by nurse practitioner Barbara Navarro in June. Prior to that visit in June his BP was less than ideally controlled and his Bystolic was increased from 5 to 10 mg/day. He incorporated that into his regimen and presented after having had a negative stress test but a borderline hypertensive response.  His heart rate was not  blunted  on the medication and he is tolerating it well. His statin therapy also was intensified changing his Crestor from 5 mg alternate day to 10 mg alternate day as his labs were not at goal.  He does note he is taking it daily and tolerating it well.  At the time of his last visit that he notes that he feels generally well, his cholesterol has dropped from 208 in February down to 161 currently HDL is only 42 triglycerides dropped from 237-1 67.  Patient notes that he walks in excess of 10,000 steps per day as part of his life but is not involved in strenuous activity on a regimented basis. While active he denies chest pain, shortness of breath, palpitations or dizziness. He has in fact gained from 240-253 at last visit he went as high as 263 and is now back down to 258 and is only lost 5 pounds. He notes today that he is incorporated his sleep apnea mask and on the days he use it he feels fabulous the next day with lots of energy, no daytime somnolence and feels great that he has had this diagnosis made and the responses had to therapy.  Patient presents today generally feeling well for interval follow-up noting that he had a good summer has not really changed his exercise routine to a great degree.

## 2024-09-21 NOTE — HISTORY OF PRESENT ILLNESS
[FreeTextEntry1] : Patient is a very pleasant 54-year-old male well-known to me with a a strong family history of heart disease when he was last here he was advised to exercise, lose weight to continue his antihypertensive regimen as well as statin therapy and return at this time to reevaluate his blood pressure control and weight issues..  Patient was originally seen in the office here in November 2023 he has known hypertension, hyperlipidemia and concerns over his strong family history of CAD. His father had a TAVR performed earlier this year. Patient notes that he was seen back in [place] by nurse practitioner Barbara Navarro in June. Prior to that visit in June his BP was less than ideally controlled and his Bystolic was increased from 5 to 10 mg/day. He incorporated that into his regimen and presented after having had a negative stress test but a borderline hypertensive response.  His heart rate was not  blunted  on the medication and he is tolerating it well. His statin therapy also was intensified changing his Crestor from 5 mg alternate day to 10 mg alternate day as his labs were not at goal.  He does note he is taking it daily and tolerating it well.  At the time of his last visit that he notes that he feels generally well, his cholesterol has dropped from 208 in February down to 161 currently HDL is only 42 triglycerides dropped from 237-1 67.  Patient notes that he walks in excess of 10,000 steps per day as part of his life but is not involved in strenuous activity on a regimented basis. While active he denies chest pain, shortness of breath, palpitations or dizziness. He has in fact gained from 240-253 at last visit he went as high as 263 and is now back down to 258 and is only lost 5 pounds. He notes today that he is incorporated his sleep apnea mask and on the days he use it he feels fabulous the next day with lots of energy, no daytime somnolence and feels great that he has had this diagnosis made and the responses had to therapy.  Patient presents today generally feeling well for interval follow-up noting that he had a good summer has not really changed his exercise routine to a great degree.

## 2024-09-21 NOTE — CARDIOLOGY SUMMARY
[de-identified] : (3/21/2024) STRESS TEST CONCLUSIONS 1. Stress electrocardiogram: No significant ischemic ST segment changes beyond baseline abnormalities. 2. No evidence of exercise induced ischemia or arrhythmias by EKG  ---------------------------------------------------------------------------------------------------------------------------------------------------------  Stress Test Results: Protocol: Standard Deshawn METS Achieved: 11 Stage Reached: 4 Exercise Duration: 9 min and 31 sec. Heart Rate: Resting 81 bpm, Stress peak 142 bpm (86 % max predicted) HR Response: Normal. Blood Pressure: Resting 104/76 mmHg, Stress max 142/96 mmHg BP Response: Normal. Exercise Capacity: Good. Pretest Chest Pain: None. Stress Test Chest Pain: No chest pain Symptoms During Stress: Shortness of breath. Reason for Termination: Target heart rate achieved, fatigue, dyspnea and patient request.   ---------------------------------------------------------------------------------------------------------------------------------------------------------Electrocardiogram: Baseline electrocardiogram: Normal sinus rhythm at a rate of 81 bpm. Stress electrocardiogram: No significant ischemic ST segment changes beyond baseline abnormalities.   Heart rate and blood pressure: The heart rate response was normal. The blood pressure response was normal.  Exercise Capacity: The patient achieved 11 METS, which is consistent with good exercise capacity.  Stress Supervising Provider: Electronically signed by [id number] Cherise Salinas Interpreting Provider: Electronically signed on 3/20/2024 at 12:46:50 PM by [id number] Sanam Bravo     [de-identified] : (3/21/2024) STRESS TEST CONCLUSIONS 1. Stress electrocardiogram: No significant ischemic ST segment changes beyond baseline abnormalities. 2. No evidence of exercise induced ischemia or arrhythmias by EKG  ---------------------------------------------------------------------------------------------------------------------------------------------------------  Stress Test Results: Protocol: Standard Deshawn METS Achieved: 11 Stage Reached: 4 Exercise Duration: 9 min and 31 sec. Heart Rate: Resting 81 bpm, Stress peak 142 bpm (86 % max predicted) HR Response: Normal. Blood Pressure: Resting 104/76 mmHg, Stress max 142/96 mmHg BP Response: Normal. Exercise Capacity: Good. Pretest Chest Pain: None. Stress Test Chest Pain: No chest pain Symptoms During Stress: Shortness of breath. Reason for Termination: Target heart rate achieved, fatigue, dyspnea and patient request.   ---------------------------------------------------------------------------------------------------------------------------------------------------------Electrocardiogram: Baseline electrocardiogram: Normal sinus rhythm at a rate of 81 bpm. Stress electrocardiogram: No significant ischemic ST segment changes beyond baseline abnormalities.   Heart rate and blood pressure: The heart rate response was normal. The blood pressure response was normal.  Exercise Capacity: The patient achieved 11 METS, which is consistent with good exercise capacity.  Stress Supervising Provider: Electronically signed by [id number] Cherise Salinas Interpreting Provider: Electronically signed on 3/20/2024 at 12:46:50 PM by [id number] Sanam Bravo (3/13/2024) ECHOCARDIOGRAPHIC CONCLUSIONS:  1. Left ventricular cavity is small. Left ventricular wall thickness is normal. Left ventricular systolic function is normal with an ejection fraction of 62 % by Schmidt's method of disks. There are no regional wall motion abnormalities seen. 2. Normal left ventricular diastolic function, with normal filling pressure. 3. Normal right ventricular cavity size, with wall thickness, and normal systolic function. 4. Mild mitral regurgitation. 5. No echocardiographic evidence of pulmonary hypertension. 6. No pericardial effusion seen. 7. Moderate left ventricular hypertrophy. 8. No prior echocardiogram is available for comparison. 9. Technically difficult image quality.

## 2024-11-05 ENCOUNTER — NON-APPOINTMENT (OUTPATIENT)
Age: 54
End: 2024-11-05

## 2025-01-04 LAB — HBA1C MFR BLD HPLC: 5.4

## 2025-01-15 ENCOUNTER — APPOINTMENT (OUTPATIENT)
Dept: CARDIOLOGY | Facility: CLINIC | Age: 55
End: 2025-01-15
Payer: COMMERCIAL

## 2025-01-15 ENCOUNTER — NON-APPOINTMENT (OUTPATIENT)
Age: 55
End: 2025-01-15

## 2025-01-15 VITALS
WEIGHT: 260 LBS | BODY MASS INDEX: 33.37 KG/M2 | SYSTOLIC BLOOD PRESSURE: 110 MMHG | OXYGEN SATURATION: 95 % | HEIGHT: 74 IN | DIASTOLIC BLOOD PRESSURE: 80 MMHG | HEART RATE: 83 BPM

## 2025-01-15 DIAGNOSIS — I51.7 CARDIOMEGALY: ICD-10-CM

## 2025-01-15 DIAGNOSIS — R73.03 PREDIABETES.: ICD-10-CM

## 2025-01-15 DIAGNOSIS — G47.33 OBSTRUCTIVE SLEEP APNEA (ADULT) (PEDIATRIC): ICD-10-CM

## 2025-01-15 DIAGNOSIS — R03.0 ELEVATED BLOOD-PRESSURE READING, W/OUT DIAGNOSIS OF HYPERTENSION: ICD-10-CM

## 2025-01-15 DIAGNOSIS — E66.3 OVERWEIGHT: ICD-10-CM

## 2025-01-15 DIAGNOSIS — E78.5 HYPERLIPIDEMIA, UNSPECIFIED: ICD-10-CM

## 2025-01-15 PROCEDURE — 99214 OFFICE O/P EST MOD 30 MIN: CPT

## 2025-01-15 PROCEDURE — 93000 ELECTROCARDIOGRAM COMPLETE: CPT

## 2025-01-15 RX ORDER — TIRZEPATIDE 2.5 MG/.5ML
2.5 INJECTION, SOLUTION SUBCUTANEOUS
Qty: 4 | Refills: 1 | Status: ACTIVE | COMMUNITY
Start: 2025-01-15 | End: 1900-01-01

## 2025-03-26 ENCOUNTER — APPOINTMENT (OUTPATIENT)
Dept: CARDIOLOGY | Facility: CLINIC | Age: 55
End: 2025-03-26
Payer: COMMERCIAL

## 2025-03-26 VITALS
OXYGEN SATURATION: 96 % | DIASTOLIC BLOOD PRESSURE: 76 MMHG | HEART RATE: 87 BPM | SYSTOLIC BLOOD PRESSURE: 116 MMHG | HEIGHT: 74 IN | WEIGHT: 260 LBS | BODY MASS INDEX: 33.37 KG/M2

## 2025-03-26 DIAGNOSIS — R73.03 PREDIABETES.: ICD-10-CM

## 2025-03-26 DIAGNOSIS — R63.5 ABNORMAL WEIGHT GAIN: ICD-10-CM

## 2025-03-26 DIAGNOSIS — E66.3 OVERWEIGHT: ICD-10-CM

## 2025-03-26 DIAGNOSIS — R03.0 ELEVATED BLOOD-PRESSURE READING, W/OUT DIAGNOSIS OF HYPERTENSION: ICD-10-CM

## 2025-03-26 DIAGNOSIS — E78.5 HYPERLIPIDEMIA, UNSPECIFIED: ICD-10-CM

## 2025-03-26 DIAGNOSIS — R53.83 OTHER FATIGUE: ICD-10-CM

## 2025-03-26 DIAGNOSIS — I51.7 CARDIOMEGALY: ICD-10-CM

## 2025-03-26 PROCEDURE — 99213 OFFICE O/P EST LOW 20 MIN: CPT

## 2025-04-01 ENCOUNTER — RX RENEWAL (OUTPATIENT)
Age: 55
End: 2025-04-01

## 2025-07-02 ENCOUNTER — APPOINTMENT (OUTPATIENT)
Dept: CARDIOLOGY | Facility: CLINIC | Age: 55
End: 2025-07-02
Payer: COMMERCIAL

## 2025-07-02 VITALS
BODY MASS INDEX: 30.81 KG/M2 | WEIGHT: 240 LBS | OXYGEN SATURATION: 96 % | HEART RATE: 88 BPM | DIASTOLIC BLOOD PRESSURE: 64 MMHG | SYSTOLIC BLOOD PRESSURE: 86 MMHG

## 2025-07-02 PROCEDURE — 99214 OFFICE O/P EST MOD 30 MIN: CPT

## 2025-07-02 PROCEDURE — 93000 ELECTROCARDIOGRAM COMPLETE: CPT
